# Patient Record
Sex: FEMALE | Race: WHITE | Employment: PART TIME | URBAN - METROPOLITAN AREA
[De-identification: names, ages, dates, MRNs, and addresses within clinical notes are randomized per-mention and may not be internally consistent; named-entity substitution may affect disease eponyms.]

---

## 2021-07-14 ENCOUNTER — OFFICE VISIT (OUTPATIENT)
Dept: AUDIOLOGY | Facility: CLINIC | Age: 56
End: 2021-07-14
Payer: COMMERCIAL

## 2021-07-14 DIAGNOSIS — H90.3 SENSORINEURAL HEARING LOSS (SNHL) OF BOTH EARS: Primary | ICD-10-CM

## 2021-07-14 PROCEDURE — 92557 COMPREHENSIVE HEARING TEST: CPT | Performed by: AUDIOLOGIST

## 2021-07-14 PROCEDURE — 92567 TYMPANOMETRY: CPT | Performed by: AUDIOLOGIST

## 2021-07-14 PROCEDURE — 99207 PR NO CHARGE LOS: CPT | Performed by: AUDIOLOGIST

## 2021-07-14 NOTE — PROGRESS NOTES
AUDIOLOGY REPORT    SUMMARY: Audiology visit completed. See audiogram for results.    RECOMMENDATIONS: Follow-up with ENT.    Melina Yeboah  Doctor of Audiology  [place] License # [id number]

## 2021-07-30 ENCOUNTER — OFFICE VISIT (OUTPATIENT)
Dept: OTOLARYNGOLOGY | Facility: CLINIC | Age: 56
End: 2021-07-30
Payer: COMMERCIAL

## 2021-07-30 ENCOUNTER — ANCILLARY PROCEDURE (OUTPATIENT)
Dept: MRI IMAGING | Facility: CLINIC | Age: 56
End: 2021-07-30
Attending: OTOLARYNGOLOGY
Payer: COMMERCIAL

## 2021-07-30 VITALS — OXYGEN SATURATION: 96 % | HEART RATE: 55 BPM | SYSTOLIC BLOOD PRESSURE: 101 MMHG | DIASTOLIC BLOOD PRESSURE: 51 MMHG

## 2021-07-30 DIAGNOSIS — H90.3 SNHL (SENSORY-NEURAL HEARING LOSS), ASYMMETRICAL: ICD-10-CM

## 2021-07-30 DIAGNOSIS — H90.3 SNHL (SENSORY-NEURAL HEARING LOSS), ASYMMETRICAL: Primary | ICD-10-CM

## 2021-07-30 PROCEDURE — A9585 GADOBUTROL INJECTION: HCPCS | Mod: JW | Performed by: RADIOLOGY

## 2021-07-30 PROCEDURE — 99203 OFFICE O/P NEW LOW 30 MIN: CPT | Performed by: OTOLARYNGOLOGY

## 2021-07-30 PROCEDURE — 70553 MRI BRAIN STEM W/O & W/DYE: CPT | Performed by: RADIOLOGY

## 2021-07-30 RX ORDER — GADOBUTROL 604.72 MG/ML
7.5 INJECTION INTRAVENOUS ONCE
Status: COMPLETED | OUTPATIENT
Start: 2021-07-30 | End: 2021-07-30

## 2021-07-30 RX ADMIN — GADOBUTROL 6 ML: 604.72 INJECTION INTRAVENOUS at 10:15

## 2021-07-30 ASSESSMENT — ENCOUNTER SYMPTOMS
COUGH: 0
BRUISES/BLEEDS EASILY: 0
STRIDOR: 0
BLURRED VISION: 0
SINUS PAIN: 0
HEARTBURN: 0
DOUBLE VISION: 0
WHEEZING: 0
NAUSEA: 0
SORE THROAT: 0
HEMOPTYSIS: 0
SPUTUM PRODUCTION: 0
VOMITING: 0
DIZZINESS: 0
CONSTITUTIONAL NEGATIVE: 1
HEADACHES: 0
SHORTNESS OF BREATH: 0
TINGLING: 0
TREMORS: 0

## 2021-07-30 NOTE — PROGRESS NOTES
HPI     This is a 56 year old patient who has been having asymmetrical hearing loss. She notices her hearing loss during COVID pandemic. Denies any tinnitus, aural fullness, pressure, drainage, pain, or vertigo. No hx of long term antibiotics, chemotherapy, radiation, acoustic trauma, or noise exposure.  Pt reports longstanding asymmetrical hearing loss. Denies vision issues, weakness, otalgia, aural fullness, otorrhea, tinnitus, and dizziness.  Results: WNL sloping to moderate SNHL left. Mild sloping to severe SNHL right. 100% word rec. Tymps WNL. Could not seal for reflexes.  Rec: ENT eval. Hearing aid trial pending medical evaluation.      Review of Systems   Constitutional: Negative.    HENT: Positive for hearing loss. Negative for congestion, ear discharge, ear pain, nosebleeds, sinus pain, sore throat and tinnitus.    Eyes: Negative for blurred vision and double vision.   Respiratory: Negative for cough, hemoptysis, sputum production, shortness of breath, wheezing and stridor.    Gastrointestinal: Negative for heartburn, nausea and vomiting.   Skin: Negative.    Neurological: Negative for dizziness, tingling, tremors and headaches.   Endo/Heme/Allergies: Negative for environmental allergies. Does not bruise/bleed easily.         Physical Exam  Vitals reviewed.   Constitutional:       Appearance: Normal appearance.   HENT:      Head: Normocephalic and atraumatic.      Right Ear: Tympanic membrane, ear canal and external ear normal. Decreased hearing noted. No middle ear effusion. There is no impacted cerumen.      Left Ear: Tympanic membrane, ear canal and external ear normal. Decreased hearing noted.  No middle ear effusion. There is no impacted cerumen.      Nose: Septal deviation present. No mucosal edema, congestion or rhinorrhea.      Mouth/Throat:      Mouth: Mucous membranes are moist.      Pharynx: Oropharynx is clear. Uvula midline.   Eyes:      Extraocular Movements: Extraocular movements intact.       Pupils: Pupils are equal, round, and reactive to light.   Neurological:      Mental Status: She is alert.       A/P    This pleasant patient is having a sloping to moderate SNHL left. Mild sloping to severe SNHL right. 100% word rec. Tymps WNL. Options were discussed. I will request and MRI to r/out any CPA lesions first and refer her to Dr. Zapata for a hearing aid fitting.

## 2021-07-30 NOTE — LETTER
7/30/2021         RE: Melanie Gabriel  [id number]  [place] [address]        Dear Colleague,    Thank you for referring your patient, Melanie Gabriel, to the [facility]. Please see a copy of my visit note below.    HPI     This is a 56 year old patient who has been having asymmetrical hearing loss. She notices her hearing loss during COVID pandemic. Denies any tinnitus, aural fullness, pressure, drainage, pain, or vertigo. No hx of long term antibiotics, chemotherapy, radiation, acoustic trauma, or noise exposure.  Pt reports longstanding asymmetrical hearing loss. Denies vision issues, weakness, otalgia, aural fullness, otorrhea, tinnitus, and dizziness.  Results: WNL sloping to moderate SNHL left. Mild sloping to severe SNHL right. 100% word rec. Tymps WNL. Could not seal for reflexes.  Rec: ENT eval. Hearing aid trial pending medical evaluation.      Review of Systems   Constitutional: Negative.    HENT: Positive for hearing loss. Negative for congestion, ear discharge, ear pain, nosebleeds, sinus pain, sore throat and tinnitus.    Eyes: Negative for blurred vision and double vision.   Respiratory: Negative for cough, hemoptysis, sputum production, shortness of breath, wheezing and stridor.    Gastrointestinal: Negative for heartburn, nausea and vomiting.   Skin: Negative.    Neurological: Negative for dizziness, tingling, tremors and headaches.   Endo/Heme/Allergies: Negative for environmental allergies. Does not bruise/bleed easily.         Physical Exam  Vitals reviewed.   Constitutional:       Appearance: Normal appearance.   HENT:      Head: Normocephalic and atraumatic.      Right Ear: Tympanic membrane, ear canal and external ear normal. Decreased hearing noted. No middle ear effusion. There is no impacted cerumen.      Left Ear: Tympanic membrane, ear canal and external ear normal. Decreased hearing noted.  No middle ear effusion. There is no impacted cerumen.       Nose: Septal deviation present. No mucosal edema, congestion or rhinorrhea.      Mouth/Throat:      Mouth: Mucous membranes are moist.      Pharynx: Oropharynx is clear. Uvula midline.   Eyes:      Extraocular Movements: Extraocular movements intact.      Pupils: Pupils are equal, round, and reactive to light.   Neurological:      Mental Status: She is alert.       A/P    This pleasant patient is having a sloping to moderate SNHL left. Mild sloping to severe SNHL right. 100% word rec. Tymps WNL. Options were discussed. I will request and MRI to r/out any CPA lesions first and refer her to Dr. Zapata for a hearing aid fitting.        Again, thank you for allowing me to participate in the care of your patient.        Sincerely,        Danielle Rodríguez MD

## 2021-07-30 NOTE — NURSING NOTE
Melanie Gabriel's goals for this visit include:   Chief Complaint   Patient presents with     Consult     Bilat hearing loss, Right ear worse. Audiogram done 7-14-21       She requests these members of her care team be copied on today's visit information: no    PCP: No Ref-Primary, Physician    Referring Provider:  No referring provider defined for this encounter.    /51   Pulse 55   SpO2 96%     Do you need any medication refills at today's visit? no

## 2021-08-03 ENCOUNTER — TELEPHONE (OUTPATIENT)
Dept: OTOLARYNGOLOGY | Facility: CLINIC | Age: 56
End: 2021-08-03

## 2021-08-03 NOTE — TELEPHONE ENCOUNTER
----- Message from Danielle Rodríguez MD sent at 8/3/2021  9:29 AM CDT -----  MRi is basically normal. Melanie will be referred to Dr. Zapata for a hearing aid trial.  Seb

## 2024-01-31 ENCOUNTER — TRANSFERRED RECORDS (OUTPATIENT)
Dept: HEALTH INFORMATION MANAGEMENT | Facility: CLINIC | Age: 59
End: 2024-01-31

## 2024-01-31 LAB
HPV ABSTRACT: NORMAL
PAP-ABSTRACT: NORMAL

## 2024-02-28 ENCOUNTER — TRANSFERRED RECORDS (OUTPATIENT)
Dept: HEALTH INFORMATION MANAGEMENT | Facility: CLINIC | Age: 59
End: 2024-02-28

## 2024-02-28 LAB
CHOLESTEROL (EXTERNAL): 215 MG/DL (ref 100–199)
GLUCOSE (EXTERNAL): 112 MG/DL (ref 70–99)
HBA1C MFR BLD: 6.5 % (ref 4.8–5.6)
HDLC SERPL-MCNC: 79 MG/DL
LDL CHOLESTEROL CALCULATED (EXTERNAL): 122 MG/DL (ref 0–99)
TRIGLYCERIDES (EXTERNAL): 78 MG/DL (ref 0–149)

## 2024-03-14 ENCOUNTER — TRANSCRIBE ORDERS (OUTPATIENT)
Dept: OTHER | Age: 59
End: 2024-03-14

## 2024-03-14 DIAGNOSIS — E11.9 TYPE 2 DIABETES MELLITUS (H): Primary | ICD-10-CM
